# Patient Record
Sex: MALE
[De-identification: names, ages, dates, MRNs, and addresses within clinical notes are randomized per-mention and may not be internally consistent; named-entity substitution may affect disease eponyms.]

---

## 2024-01-11 ENCOUNTER — NON-APPOINTMENT (OUTPATIENT)
Age: 34
End: 2024-01-11

## 2024-01-11 ENCOUNTER — APPOINTMENT (OUTPATIENT)
Dept: UROLOGY | Facility: CLINIC | Age: 34
End: 2024-01-11
Payer: COMMERCIAL

## 2024-01-11 VITALS
TEMPERATURE: 99 F | SYSTOLIC BLOOD PRESSURE: 124 MMHG | HEART RATE: 93 BPM | BODY MASS INDEX: 21 KG/M2 | DIASTOLIC BLOOD PRESSURE: 80 MMHG | HEIGHT: 71 IN | OXYGEN SATURATION: 95 % | WEIGHT: 150 LBS

## 2024-01-11 PROBLEM — Z00.00 ENCOUNTER FOR PREVENTIVE HEALTH EXAMINATION: Status: ACTIVE | Noted: 2024-01-11

## 2024-01-11 PROCEDURE — 99203 OFFICE O/P NEW LOW 30 MIN: CPT

## 2024-01-11 NOTE — HISTORY OF PRESENT ILLNESS
[FreeTextEntry1] : Name GURU ALCARAZ  MRN [id number]   1990  Contact Number: [phone] ----------------------------------------------------------------------------------------------------------------------------------------- Date of First Visit: 24  Referring Provider/PCP: none -----------------------------------------------------------------------------------------------------------------------------------------  CC: PSA Screening  History of Present Illness: GURU ALCARAZ is a 33 year old male who presents for prostate cancer screening. Patient reports saw some incentive for prostate cancer screening from insurance so presented today for evaluation. Patient denies any issues with urination. No frequency, urgency. Empties bladder without issue. Never seen blood in urine. No history UTI/STI. No history kidney stones.  Patient denies any family history prostate cancer. No breast or ovarian cancer. No chemical exposures.  PMH: none PSH: pneumothorax/bleb removal 12 years ago Family History: no  malignancies, no breast or ovarian cancer Social: , no children, , rare alcohol, no smoking, no recreational drugs Meds: vitamins Allergies: NKDA ROS: no fevers, chills, flank pain, dysuria

## 2024-01-11 NOTE — ASSESSMENT
[FreeTextEntry1] : 33 year presents for discussion of PSA screening.. Options for insurance incentive. We discussed AUA guidelines - screening for normal risk patient starts between 45-50 years of age. Patient has no known risk factors. No screening indicated at this time. Patient in agreement with plan. Return for screening at age 45 or sooner with any issues.